# Patient Record
Sex: MALE | Race: WHITE
[De-identification: names, ages, dates, MRNs, and addresses within clinical notes are randomized per-mention and may not be internally consistent; named-entity substitution may affect disease eponyms.]

---

## 2017-02-19 ENCOUNTER — HOSPITAL ENCOUNTER (EMERGENCY)
Dept: HOSPITAL 61 - ER | Age: 62
Discharge: HOME | End: 2017-02-19
Payer: COMMERCIAL

## 2017-02-19 VITALS — HEIGHT: 72 IN | BODY MASS INDEX: 27.09 KG/M2 | WEIGHT: 200 LBS

## 2017-02-19 VITALS
SYSTOLIC BLOOD PRESSURE: 168 MMHG | SYSTOLIC BLOOD PRESSURE: 168 MMHG | DIASTOLIC BLOOD PRESSURE: 95 MMHG | DIASTOLIC BLOOD PRESSURE: 95 MMHG

## 2017-02-19 DIAGNOSIS — D72.829: ICD-10-CM

## 2017-02-19 DIAGNOSIS — I10: ICD-10-CM

## 2017-02-19 DIAGNOSIS — Z88.8: ICD-10-CM

## 2017-02-19 DIAGNOSIS — R00.0: ICD-10-CM

## 2017-02-19 DIAGNOSIS — E86.0: ICD-10-CM

## 2017-02-19 DIAGNOSIS — J11.1: Primary | ICD-10-CM

## 2017-02-19 LAB
ALBUMIN SERPL-MCNC: 3.7 G/DL (ref 3.4–5)
ALP SERPL-CCNC: 77 U/L (ref 46–116)
ALT SERPL-CCNC: 26 U/L (ref 16–63)
ANION GAP SERPL CALC-SCNC: 12 MMOL/L (ref 6–14)
AST SERPL-CCNC: 18 U/L (ref 15–37)
BACTERIA #/AREA URNS HPF: (no result) /HPF
BASOPHILS # BLD AUTO: 0 X10^3/UL (ref 0–0.2)
BASOPHILS NFR BLD: 0 % (ref 0–3)
BILIRUB DIRECT SERPL-MCNC: 0.1 MG/DL (ref 0–0.2)
BILIRUB SERPL-MCNC: 1.1 MG/DL (ref 0.2–1)
BILIRUB UR QL STRIP: NEGATIVE
BUN SERPL-MCNC: 16 MG/DL (ref 8–26)
CALCIUM SERPL-MCNC: 9.1 MG/DL (ref 8.5–10.1)
CHLORIDE SERPL-SCNC: 104 MMOL/L (ref 98–107)
CO2 SERPL-SCNC: 26 MMOL/L (ref 21–32)
CREAT SERPL-MCNC: 1 MG/DL (ref 0.7–1.3)
EOSINOPHIL NFR BLD: 0 % (ref 0–3)
ERYTHROCYTE [DISTWIDTH] IN BLOOD BY AUTOMATED COUNT: 13.7 % (ref 11.5–14.5)
GFR SERPLBLD BASED ON 1.73 SQ M-ARVRAT: 75.5 ML/MIN
GLUCOSE SERPL-MCNC: 104 MG/DL (ref 70–99)
GLUCOSE UR STRIP-MCNC: NEGATIVE MG/DL
HCT VFR BLD CALC: 46.4 % (ref 39–53)
HGB BLD-MCNC: 15.5 G/DL (ref 13–17.5)
LYMPHOCYTES # BLD: 0.6 X10^3/UL (ref 1–4.8)
LYMPHOCYTES NFR BLD AUTO: 5 % (ref 24–48)
MCH RBC QN AUTO: 33 PG (ref 25–35)
MCHC RBC AUTO-ENTMCNC: 34 G/DL (ref 31–37)
MCV RBC AUTO: 98 FL (ref 79–100)
MONOCYTES NFR BLD: 10 % (ref 0–9)
NEUTROPHILS NFR BLD AUTO: 84 % (ref 31–73)
NITRITE UR QL STRIP: NEGATIVE
OBC FLU: (no result)
PH UR STRIP: 5.5 [PH]
PLATELET # BLD AUTO: 297 X10^3/UL (ref 140–400)
POTASSIUM SERPL-SCNC: 3.8 MMOL/L (ref 3.5–5.1)
PROT SERPL-MCNC: 7.5 G/DL (ref 6.4–8.2)
PROT UR STRIP-MCNC: 100 MG/DL
RBC # BLD AUTO: 4.75 X10^6/UL (ref 4.3–5.7)
RBC #/AREA URNS HPF: (no result) /HPF (ref 0–2)
SODIUM SERPL-SCNC: 142 MMOL/L (ref 136–145)
SP GR UR STRIP: >=1.03
SQUAMOUS #/AREA URNS LPF: (no result) /LPF
UROBILINOGEN UR-MCNC: 0.2 MG/DL
WBC # BLD AUTO: 12.3 X10^3/UL (ref 4–11)
WBC #/AREA URNS HPF: (no result) /HPF (ref 0–4)

## 2017-02-19 PROCEDURE — 84484 ASSAY OF TROPONIN QUANT: CPT

## 2017-02-19 PROCEDURE — 80048 BASIC METABOLIC PNL TOTAL CA: CPT

## 2017-02-19 PROCEDURE — 85027 COMPLETE CBC AUTOMATED: CPT

## 2017-02-19 PROCEDURE — 71010: CPT

## 2017-02-19 PROCEDURE — 36415 COLL VENOUS BLD VENIPUNCTURE: CPT

## 2017-02-19 PROCEDURE — 93005 ELECTROCARDIOGRAM TRACING: CPT

## 2017-02-19 PROCEDURE — 80076 HEPATIC FUNCTION PANEL: CPT

## 2017-02-19 PROCEDURE — 83690 ASSAY OF LIPASE: CPT

## 2017-02-19 PROCEDURE — 81001 URINALYSIS AUTO W/SCOPE: CPT

## 2017-02-19 PROCEDURE — 70450 CT HEAD/BRAIN W/O DYE: CPT

## 2017-02-19 PROCEDURE — 87804 INFLUENZA ASSAY W/OPTIC: CPT

## 2017-02-19 NOTE — PHYS DOC
Past Medical History


Past Medical History:  Hypertension


Past Surgical History:  Other


Additional Past Surgical Histo:  HERNIA REPAIR


Alcohol Use:  None


Drug Use:  None





Adult General


Chief Complaint


Chief Complaint:  Influenza





HPI


HPI


61-year-old male presenting to the emergency department with a productive cough 

for the past 3 weeks. He reports thick cleared sputum. He reports fevers 

myalgias and chills at home. He has been using multiple different medications 

including cough syrups antibiotics and steroids and has been seen by his 

primary care. Today he has had worsening shortness of breath with headache and 

body aches. His pain is moderate intermittent generalized and without 

alleviating factors.





Review of systems is negative for chest pain abdominal pain nausea or vomiting. 

All other review of systems is negative unless otherwise noted in history of 

present illness.





Review of Systems


Review of Systems


SEE ABOVE.





Allergies


Allergies





 Allergies








Coded Allergies Type Severity Reaction Last Updated Verified


 


  meperidine Allergy Intermediate  2/19/17 Yes











Physical Exam


Physical Exam





Constitutional: Well developed, well nourished, no acute distress, non-toxic 

appearance. 


HENT: Normocephalic, atraumatic, bilateral external ears normal, oropharynx 

moist, no oral exudates, nose normal. []


Eyes: PERRLA, EOMI, conjunctiva normal, no discharge.  


Neck: Normal range of motion, no tenderness, supple, no stridor. [] 


Cardiovascular:Heart rate regular rhythm, no murmur 


Lungs & Thorax:  Bilateral breath sounds clear to auscultation []


Abdomen: Bowel sounds normal, soft, no tenderness, no masses, no pulsatile 

masses.  


Skin: Warm, dry, no erythema, no rash. [] 


Back: No tenderness, no CVA tenderness. [] 


Extremities: No tenderness, no cyanosis, no clubbing, ROM intact, no edema.  


Neurologic: Alert and oriented X 3, normal motor function, normal sensory 

function, no focal deficits noted. 


Psychologic: Affect normal, judgement normal, mood normal. []





Current Patient Data


Vital Signs





 Vital Signs








  Date Time  Temp Pulse Resp B/P Pulse Ox O2 Delivery O2 Flow Rate FiO2


 


2/19/17 23:04  100  168/95    


 


2/19/17 20:49 99.3  25  96 Room Air  





 99.3       








Lab Values





 Laboratory Tests








Test


  2/19/17


21:15 2/19/17


22:22 2/19/17


22:38


 


White Blood Count


  12.3x10^3/uL


(4.0-11.0)  H 


  


 


 


Red Blood Count


  4.75x10^6/uL


(4.30-5.70) 


  


 


 


Hemoglobin


  15.5g/dL


(13.0-17.5) 


  


 


 


Hematocrit


  46.4%


(39.0-53.0) 


  


 


 


Mean Corpuscular Volume 98fL ()    


 


Mean Corpuscular Hemoglobin 33pg (25-35)    


 


Mean Corpuscular Hemoglobin


Concent 34g/dL (31-37)


  


  


 


 


Red Cell Distribution Width


  13.7%


(11.5-14.5) 


  


 


 


Platelet Count


  297x10^3/uL


(140-400) 


  


 


 


Neutrophils (%) (Auto) 84% (31-73)  H  


 


Lymphocytes (%) (Auto) 5% (24-48)  L  


 


Monocytes (%) (Auto) 10% (0-9)  H  


 


Eosinophils (%) (Auto) 0% (0-3)    


 


Basophils (%) (Auto) 0% (0-3)    


 


Neutrophils # (Auto)


  10.3x10^3uL


(1.8-7.7)  H 


  


 


 


Lymphocytes # (Auto)


  0.6x10^3/uL


(1.0-4.8)  L 


  


 


 


Monocytes # (Auto)


  1.3x10^3/uL


(0.0-1.1)  H 


  


 


 


Eosinophils # (Auto)


  0.0x10^3/uL


(0.0-0.7) 


  


 


 


Basophils # (Auto)


  0.0x10^3/uL


(0.0-0.2) 


  


 


 


Sodium Level


  142mmol/L


(136-145) 


  


 


 


Potassium Level


  3.8mmol/L


(3.5-5.1) 


  


 


 


Chloride Level


  104mmol/L


() 


  


 


 


Carbon Dioxide Level


  26mmol/L


(21-32) 


  


 


 


Anion Gap 12 (6-14)    


 


Blood Urea Nitrogen


  16mg/dL (8-26)


  


  


 


 


Creatinine


  1.0mg/dL


(0.7-1.3) 


  


 


 


Estimated GFR


(Cockcroft-Gault) 75.5  


  


  


 


 


Glucose Level


  104mg/dL


(70-99)  H 


  


 


 


Calcium Level


  9.1mg/dL


(8.5-10.1) 


  


 


 


Total Bilirubin


  1.1mg/dL


(0.2-1.0)  H 


  


 


 


Direct Bilirubin


  0.1mg/dL


(0.0-0.2) 


  


 


 


Aspartate Amino Transferase


(AST) 18U/L (15-37)  


  


  


 


 


Alanine Aminotransferase (ALT) 26U/L (16-63)    


 


Alkaline Phosphatase


  77U/L ()


  


  


 


 


Troponin I Quantitative


  < 0.017ng/mL


(0.000-0.055) 


  


 


 


Total Protein


  7.5g/dL


(6.4-8.2) 


  


 


 


Albumin


  3.7g/dL


(3.4-5.0) 


  


 


 


Lipase


  173U/L


() 


  


 


 


Influenza Type A Antigen


  


  Positive


(NEGATIVE) 


 


 


Influenza Type B Antigen


  


  Negative


(NEGATIVE) 


 


 


Urine Collection Type   Unknown  


 


Urine Color   Yellow  


 


Urine Clarity   Cloudy  


 


Urine pH   5.5  


 


Urine Specific Gravity   >=1.030  


 


Urine Protein


  


  


  100mg/dL


(NEG-TRACE)


 


Urine Glucose (UA)


  


  


  Negativemg/dL


(NEG)


 


Urine Ketones (Stick)


  


  


  Negativemg/dL


(NEG)


 


Urine Blood   Small (NEG)  


 


Urine Nitrite


  


  


  Negative (NEG)


 


 


Urine Bilirubin


  


  


  Negative (NEG)


 


 


Urine Urobilinogen Dipstick


  


  


  0.2mg/dL (0.2


mg/dL)


 


Urine Leukocyte Esterase


  


  


  Negative (NEG)


 


 


Urine RBC   3-5/HPF (0-2)  


 


Urine WBC   1-4/HPF (0-4)  


 


Urine Squamous Epithelial


Cells 


  


  Many/LPF  


 


 


Urine Amorphous Sediment   Present/HPF  


 


Urine Bacteria


  


  


  Few/HPF


(0-FEW)


 


Urine Granular Casts


  


  


  Occasional/HPF


 


 


Urine Mucus   Marked/LPF  





 Laboratory Tests


2/19/17 21:15








 Laboratory Tests


2/19/17 21:15














EKG


EKG


EKG shows sinus rhythm with regular rate. Normal intervals. left dupree axis. ST 

segments are congruent. Not suggestive of ACS. Reviewed by myself.[]





Radiology/Procedures


Radiology/Procedures


[]Chest x-ray reviewed by myself shows no obvious infiltrate or pneumothorax 

present. No  obvious acute cardiopulmonary process present.





Course & Med Decision Making


Course & Med Decision Making


Pertinent Labs and Imaging studies reviewed. (See chart for details)





[] 61-year-old male presenting to the emergency department with signs and 

symptoms suggestive of influenza. On examination the patient had a low-grade 

temperature with mild tachycardia. Otherwise hypertension. Physical exam was 

unremarkable. Chest x-ray unremarkable. EKG unremarkable. Blood work obtained 

which showed mild leukocytosis urinalysis showed dehydration without suggestion 

of infection. Chemistry panel unremarkable. Flu testing positive. The patient 

was prescribed Tamiflu ibuprofen and azithromycin to follow-up with his primary 

care physician over the next 2-3 days.





Dragon Disclaimer


Dragon Disclaimer


This electronic medical record was generated, in whole or in part, using a 

voice recognition dictation system.





Departure


Departure


Impression:  


 Primary Impression:  


 Influenza


Disposition:  09 ADMITTED AS INPATIENT


Condition:  STABLE


Referrals:  


LILLIG,SHAWN P MD (PCP)


Patient Instructions:  Influenza, Adult





Additional Instructions:


Thank you for allowing us to participate in your care today.





Followup with your primary care physician in 3 days if your symptoms do not 

improve. If you do not have a primary care provider you can ask for a list of 

our primary care providers. Return to the emergency department you have any new 

or concerning findings.





This should be evaluated by the primary care physician and any necessary 

consulting services for continued management within a few days after discharge. 

Return to emergency room if you have any  new or concerning symptoms including 

but not limited to fever, chills, nausea, vomiting, intractable pain, any new 

rashes, chest pain, shortness of air, uncontrolled bleeding, difficulty 

breathing, and/or vision loss.


Scripts


Azithromycin (Azithromycin Tablet)250 Mg Tablet1 Pkg PO UD #6 TAB


   Prov:TASH VIVAR MD         2/19/17


Oseltamivir Phosphate (Tamiflu)75 Mg Capsule1 Cap PO BID #10 CAP


   Prov:TASH VIVAR MD         2/19/17


Ibuprofen 200 Mg [id number] Mg PO PRN Q6HRS PRN INFLAMMATION #15 TAB


   Prov:TASH VIVAR MD         2/19/17








TASH VIVAR MD Feb 19, 2017 23:16

## 2017-02-20 NOTE — RAD
Portable chest, 2/19/2017:



History: Cough



Comparison is made to a study from 10/2/2011. The heart size and pulmonary

vascularity are normal. There is mild tortuosity of the thoracic aorta. No

pulmonary infiltrates are seen. There is no evidence of pleural fluid. Mild

spurring is present in the spine.



IMPRESSION: No acute cardiopulmonary abnormality is detected.

## 2017-02-20 NOTE — EKG
[facility]

               [address] [place] [address]

Test Date:    2017               Test Time:    21:25:53

Pat Name:     HAKAN RODRIGUEZ           Department:   

Patient ID:   [id number]           Room:          

Gender:       M                        Technician:   

:          1955               Requested By: TASH VIVAR

Order Number: [id number]            Reading MD:     

                                 Measurements

Intervals                              Axis          

Rate:         96                       P:            34

MS:           132                      QRS:          4

QRSD:         90                       T:            19

QT:           332                                    

QTc:          426                                    

                           Interpretive Statements

SINUS RHYTHM

NO SPECIFIC ECG ABNORMALITIES

RI6.01

No previous ECG available for comparison

## 2019-02-05 ENCOUNTER — HOSPITAL ENCOUNTER (OUTPATIENT)
Dept: HOSPITAL 61 - SURGPAT | Age: 64
Discharge: HOME | End: 2019-02-05
Attending: SURGERY
Payer: COMMERCIAL

## 2019-02-05 DIAGNOSIS — K40.90: ICD-10-CM

## 2019-02-05 DIAGNOSIS — Z01.818: Primary | ICD-10-CM

## 2019-02-05 PROCEDURE — 93005 ELECTROCARDIOGRAM TRACING: CPT

## 2019-02-05 NOTE — EKG
[facility]

              [address] [place] [address]

Test Date:    2019               Test Time:    14:30:07

Pat Name:     HAKAN RODRIGUEZ           Department:   

Patient ID:   [id number]           Room:          

Gender:       M                        Technician:   SUNSHINE

:          1955               Requested By: LAURITA CASILLAS

Order Number: [id number]           Reading MD:   Sawyer Moreno MD

                                 Measurements

Intervals                              Axis          

Rate:         78                       P:            45

VT:           146                      QRS:          1

QRSD:         88                       T:            33

QT:           364                                    

QTc:          418                                    

                           Interpretive Statements

SINUS RHYTHM



Electronically Signed On 2019 16:42:31 CST by Sawyer Moreno MD

## 2019-02-08 ENCOUNTER — HOSPITAL ENCOUNTER (OUTPATIENT)
Dept: HOSPITAL 61 - SURG | Age: 64
Discharge: HOME | End: 2019-02-08
Attending: SURGERY
Payer: COMMERCIAL

## 2019-02-08 VITALS
SYSTOLIC BLOOD PRESSURE: 143 MMHG | DIASTOLIC BLOOD PRESSURE: 86 MMHG | SYSTOLIC BLOOD PRESSURE: 143 MMHG | DIASTOLIC BLOOD PRESSURE: 86 MMHG

## 2019-02-08 VITALS — WEIGHT: 186 LBS | BODY MASS INDEX: 25.19 KG/M2 | HEIGHT: 72 IN

## 2019-02-08 DIAGNOSIS — Z82.49: ICD-10-CM

## 2019-02-08 DIAGNOSIS — F17.200: ICD-10-CM

## 2019-02-08 DIAGNOSIS — K40.90: Primary | ICD-10-CM

## 2019-02-08 DIAGNOSIS — Z88.6: ICD-10-CM

## 2019-02-08 DIAGNOSIS — Z79.899: ICD-10-CM

## 2019-02-08 DIAGNOSIS — Z88.8: ICD-10-CM

## 2019-02-08 DIAGNOSIS — I10: ICD-10-CM

## 2019-02-08 PROCEDURE — C1781 MESH (IMPLANTABLE): HCPCS

## 2019-02-08 PROCEDURE — 49650 LAP ING HERNIA REPAIR INIT: CPT

## 2019-02-08 PROCEDURE — A7015 AEROSOL MASK USED W NEBULIZE: HCPCS

## 2019-02-08 PROCEDURE — C1782 MORCELLATOR: HCPCS

## 2019-02-08 RX ADMIN — FENTANYL CITRATE PRN MCG: 50 INJECTION INTRAMUSCULAR; INTRAVENOUS at 09:59

## 2019-02-08 RX ADMIN — FENTANYL CITRATE PRN MCG: 50 INJECTION INTRAMUSCULAR; INTRAVENOUS at 10:16

## 2019-02-08 NOTE — DISCH
DISCHARGE INSTRUCTIONS


Condition on Discharge


Condition on Discharge:  Stable





Activity After Discharge


Activity Instructions for Disc:  Avoid exertion


Other activity instructions:  no lifting more than 20 pounds for 2 weeks





Diet after Discharge


Diet after Discharge:  Regular





Wound Incision Care


Other wound/incision instructi:  May shower in 24 hours





Contacting the DRChey after DC


Call your doctor for:  If your condition worsens





Follow-Up


Follow up with:  Dr. Casillas in 2 weeks











LAURITA CASILLAS MD Feb 8, 2019 10:19

## 2019-02-08 NOTE — PDOC4
Operative Note


Operative Note


Date: 2/8/2019   


Preoperative diagnosis: Right inguinal hernia


Postoperative diagnosis: Same


Procedure: Robotic-assisted laparoscopic right inguinal hernia repair with mesh


Surgeon: Ismael


Specimen: None


Dictation: Patient is a 63-year-old male is complained of right groin pain with 

a bulge procedure of robotic-assisted laparoscopic right inguinal hernia repair 

with mesh was explained to the patient in detail risks benefits were also 

discussed including bleeding infection injury to intra-abdominal contents 

possibly necessitating further or open operations. The patient seemed to 

understand and gave both verbal and written consent to have the procedure 

performed. Patient was taken to the operative Raposa supine position general 

anesthesia was initiated once patient was sleep and intubated is placed in low 

lithotomy position and his abdomen was prepped and draped usual sterile fashion 

using ChloraPrep and area just above the umbilicus was injected with quarter 

percent Marcaine with epinephrine incision was made Lembert scalpel Veress 

needle was placed within the abdomen creating pneumoperitoneum once this 

complete a da Erna 8 mm port was placed and a camera was placed within the 

abdomen to inspected patient was placed in steep Trendelenburg was noted there 

is fairly large right inguinal hernia with some incarcerated small bowel left 

side appeared normal. A 8 mm da Erna ports placed in the right mid abdomen and 

a second 8 mm da Erna ports placed in the left mid abdomen that eventually 

robotic then brought in and docked all port sites surgeon went to the robotic 

console using cautery grasper and Endo Jose Francisco scissors the peritoneum over the 

right groin area was incised this was propagated posteriorly reducing all 

hernia contents in the hernia sac was reduced once this pocket was complete a 

large Bard 3-D max mesh for the right side was placed over the hernia defect 

the peritoneum was then closed with a running 20 the LOC absorbable suture. 

Surgeon re-scrubbed back to the operative field and all ports were removed and 

intervention robot undocked all incisions at the skin sites were closed for 

septic tumor Monocryl Mastisol Steri-Strips and island dressings were applied. 

The patient was awakened and extubated in the operating room taken to recovery 

in stable condition all sponge instrument needle counts listed as correct 

estimated blood loss 5 mL.











LAURITA CASILLAS MD Feb 8, 2019 09:11

## 2019-10-30 ENCOUNTER — HOSPITAL ENCOUNTER (OUTPATIENT)
Dept: HOSPITAL 61 - US | Age: 64
Discharge: HOME | End: 2019-10-30
Attending: SURGERY
Payer: COMMERCIAL

## 2019-10-30 DIAGNOSIS — K40.91: Primary | ICD-10-CM

## 2019-10-30 PROCEDURE — 76881 US COMPL JOINT R-T W/IMG: CPT

## 2019-10-30 NOTE — RAD
EXAM: Soft tissue ultrasound right groin.

 

HISTORY: Right inguinal hernia repair in February. Now with groin pain 

after a pop.

 

COMPARISON: None.

 

FINDINGS: Sonographic evaluation of the right groin was performed at the 

site of concern. This reveals a recurrent fat-containing hernia. The 

hernia sac measures 4.7 x 2.8 cm. No bowel loops are involved.

 

IMPRESSION: 

1. Fat-containing recurrent right inguinal hernia.

 

Electronically signed by: MEGAN Solano MD (10/30/2019 4:36 PM) 

[facility]

## 2020-01-24 ENCOUNTER — HOSPITAL ENCOUNTER (OUTPATIENT)
Dept: HOSPITAL 61 - SURG | Age: 65
Discharge: HOME | End: 2020-01-24
Attending: SURGERY
Payer: MEDICARE

## 2020-01-24 VITALS
DIASTOLIC BLOOD PRESSURE: 89 MMHG | DIASTOLIC BLOOD PRESSURE: 89 MMHG | SYSTOLIC BLOOD PRESSURE: 138 MMHG | SYSTOLIC BLOOD PRESSURE: 138 MMHG

## 2020-01-24 VITALS — WEIGHT: 185.19 LBS | HEIGHT: 72 IN | BODY MASS INDEX: 25.08 KG/M2

## 2020-01-24 DIAGNOSIS — Z88.8: ICD-10-CM

## 2020-01-24 DIAGNOSIS — Z79.899: ICD-10-CM

## 2020-01-24 DIAGNOSIS — K40.91: Primary | ICD-10-CM

## 2020-01-24 DIAGNOSIS — Z98.890: ICD-10-CM

## 2020-01-24 DIAGNOSIS — I10: ICD-10-CM

## 2020-01-24 PROCEDURE — C1781 MESH (IMPLANTABLE): HCPCS

## 2020-01-24 PROCEDURE — 49651 LAP ING HERNIA REPAIR RECUR: CPT

## 2020-01-24 PROCEDURE — A7015 AEROSOL MASK USED W NEBULIZE: HCPCS

## 2020-01-24 NOTE — PDOC1
History and Physical


Date of Admission


Date of Admission


DATE: 1/24/20 


TIME: 07:45





Identification/Chief Complaint


Chief Complaint


Right groin pain





Source


Source:  Patient





History of Present Illness


History of Present Illness


64-year-old male had a right inguinal hernia repair done February 2019 several 

months after he developed recurrent right groin pain ultrasound shows recurrent 

right inguinal hernia





Past Medical History


Cardiovascular:  HTN


Pulmonary:  No pertinent hx


GI:  No pertinent hx


Heme/Onc:  No pertinent hx


Hepatobiliary:  No pertinent hx


Psych:  No pertinent hx


Rheumatologic:  No pertinent hx


Infectious disease:  No pertinent hx


ENT:  No pertinent hx


Endocrine:  No pertinent hx


Dermatology:  No pertinent hx





Past Surgical History


Past Surgical History:  Hernia Repair (bilateral inguinal hernia repairs)





Family History


Family History:  No Significant





Social History


Smoke:  No


ALCOHOL:  none


Drugs:  None





Current Medications


Current Medications





Current Medications


Ondansetron HCl (Zofran) 4 mg PRN Q6HRS  PRN IV NAUSEA/VOMITING;  Start 1/24/20 

at 07:00;  Stop 1/25/20 at 06:59


Fentanyl Citrate (Fentanyl 2ml Vial) 25 mcg PRN Q5MIN  PRN IV MILD PAIN 1-3;  

Start 1/24/20 at 07:00;  Stop 1/25/20 at 06:59


Fentanyl Citrate (Fentanyl 2ml Vial) 50 mcg PRN Q5MIN  PRN IV MODERATE TO SEVERE

PAIN;  Start 1/24/20 at 07:00;  Stop 1/25/20 at 06:59


Morphine Sulfate (Morphine Sulfate) 1 mg PRN Q10MIN  PRN IV SEVERE PAIN 7-10;  

Start 1/24/20 at 07:00;  Stop 1/25/20 at 06:59


Ringer's Solution 1,000 ml @  30 mls/hr Q24H IV  Last administered on 1/24/20at 

07:25;  Start 1/24/20 at 07:00;  Stop 1/24/20 at 18:59


Lidocaine HCl (Xylocaine-Mpf 1% 2ml Vial) 2 ml PRN 1X  PRN ID PRIOR TO IV START;

 Start 1/24/20 at 07:00;  Stop 1/25/20 at 06:59


Hydromorphone HCl (Dilaudid) 0.5 mg PRN Q10MIN  PRN IV SEV PAIN, Second choice; 

Start 1/24/20 at 07:00;  Stop 1/25/20 at 06:59


Prochlorperazine Edisylate (Compazine) 5 mg PACU PRN  PRN IV NAUSEA, MRX1;  

Start 1/24/20 at 07:00;  Stop 1/25/20 at 06:59


Acetaminophen (Tylenol) 1,000 mg OC PROC  PRN PO PRE-OP Last administered on 

1/24/20at 07:26;  Start 1/24/20 at 06:00;  Stop 1/24/20 at 18:00


Cefazolin Sodium/ Dextrose 50 ml @  100 mls/hr 1X PREOP  PRN IV PRIOR TO 

PROCEDURE;  Start 1/24/20 at 06:00;  Stop 1/24/20 at 18:00


Bupivacaine HCl/ Epinephrine Bitart (Sensorcaine-Epi 0.25%-1:200543 Mpf) 30 ml 

STK-MED ONCE .ROUTE ;  Start 1/24/20 at 07:23;  Stop 1/24/20 at 07:23;  Status 

DC





Active Scripts


Active


Reported


Omeprazole 40 Mg Capsule.dr 1 Cap PO DAILY


Lisinopril 20 Mg Tablet 1 Tab PO BID





Allergies


Allergies:  


Coded Allergies:  


     meperidine (Verified  Adverse Reaction, Intermediate, Pt imobilized.  Makes

skin hurt., 1/24/20)


     morphine (Verified  Adverse Reaction, Mild, Doesn't work, 1/24/20)





ROS


Genitourinary:  YES Pain (right groin pain)





Physical Exam


General:  Alert, Oriented X3, Cooperative, mild distress


HEENT:  Atraumatic, PERRLA, EOMI


Lungs:  Clear to auscultation, Normal air movement


Heart:  RRR, no murmurs


Abdomen:  Normal bowel sounds, Soft, No tenderness


Male Genitals Exam:  inguinal tenderness (right groin tenderness with asymmetry)


Rectal Exam:  not examined


Extremities:  No edema


Skin:  No significant lesion


Neuro:  Normal speech


Psych/Mental Status:  Mental status NL





Vitals


Vitals





Vital Signs








  Date Time  Temp Pulse Resp B/P (MAP) Pulse Ox O2 Delivery O2 Flow Rate FiO2


 


1/24/20 07:21 97.9 77 20  100   





 97.9       


 


1/24/20 07:13    163/87  Room Air  











VTE Prophylaxis Ordered


VTE Prophylaxis Devices:  Yes


VTE Pharmacological Prophylaxi:  Contraindicated





Assessment/Plan


Assessment/Plan


Recurrent right inguinal hernia plan robotic-assisted laparoscopic inguinal 

hernia repair with mesh











LAURITA CASILLAS MD             Jan 24, 2020 07:48

## 2020-01-24 NOTE — PDOC4
Operative Note


Operative Note


Date: 1/24/2020


Preoperative diagnosis: Recurrent right inguinal hernia


Postoperative diagnosis: Same


Procedure: Robotic-assisted laparoscopic right inguinal hernia repair with mesh


Surgeon: Ismael


Specimen: None


Dictation: Patient is a 64-year-old woman who had bilateral inguinal hernia 

repairs many years ago developed a recurrence in the right inguinal hernia 

underwent laparoscopic repair approximately 1 year ago has developed a 

recurrence procedure of robotic-assisted laparoscopic right inguinal hernia 

repair was explained to the patient detail risk benefits were also discussed 

including bleeding infection injury to intra-abdominal contents possibly 

necessitating further or open operations alternatives to this procedure also 

discussed with the patient who seemed to understand and gave both verbal and 

written consent to have the procedure performed. Patient was taken to the 

operating room placed in supine position general anesthesia was initiated once 

patient was sleep and intubated was placed in low lithotomy position and his 

abdomen was prepped and draped usual sterile fashion using ChloraPrep and area 

at the umbilicus was injected with quarter percent Marcaine with epinephrine 

incision was made 11 blade scalpel and a varies needle was placed within the 

abdomen creating pneumoperitoneum once this complete 8mm da Erna port was 

placed and a da Erna camera was placed within the abdomen which was inspected 

was noted in the right groin have a fairly large direct inguinal hernia. A area 

in the right mid abdomen was injected with quarter percent Marcaine incision was

made with an 11 blade  scalpel and a 8 mm da Erna port was placed in the right 

side as well as on the left. Eventually robot was brought in and docked all port

sites surgeon went to the robotic console using grasper and Endo Jose Francisco scissors

the peritoneum over the right side was taken down reducing the hernia defect. 

Fairly large direct hernia used a Bard ventral light ST mesh 4 x 6" this was 

placed over the hernia defect this was treated into place with 3-0 Vicryl 

superiorly along the border of the mesh to the abdominal wall and then a suture 

was placed at the Ruperto's ligament. The perineum was then closed over the mesh 

with a running V LOC absorbable suture. Once this was complete the 

pneumoperitoneum was reduced all ports removed the skin incisions were closed f

or septic and a Monocryl Mastisol Steri-Strips and island dressings were 

applied. Patient was awakened and asked bated operating room taken to recovery 

in stable condition all sponge instrument needle counts listed as correct 

estimated blood loss 5 mL











LAURITA CASILLAS MD             Jan 24, 2020 09:42

## 2020-01-24 NOTE — DISCH
DISCHARGE INSTRUCTIONS


Condition on Discharge


Condition on Discharge:  Stable





Activity After Discharge


Activity Instructions for Disc:  Avoid exertion


Other activity instructions:  no lifting more than 20 pounds for 2 weeks





Diet after Discharge


Diet after Discharge:  Regular





Wound Incision Care


Other wound/incision instructi:  a shower in 24 hours





Contacting the DRChey after DC


Call your doctor for:  If your condition worsens





Follow-Up


Follow up with:  Dr. Casillas in 2 weeks











LAURITA CASILLAS MD             Jan 24, 2020 09:44

## 2020-02-11 ENCOUNTER — HOSPITAL ENCOUNTER (OUTPATIENT)
Dept: HOSPITAL 61 - KCIC MRI | Age: 65
Discharge: HOME | End: 2020-02-11
Attending: FAMILY MEDICINE
Payer: COMMERCIAL

## 2020-02-11 DIAGNOSIS — M25.78: ICD-10-CM

## 2020-02-11 DIAGNOSIS — M43.12: ICD-10-CM

## 2020-02-11 DIAGNOSIS — M47.22: ICD-10-CM

## 2020-02-11 DIAGNOSIS — M53.2X2: ICD-10-CM

## 2020-02-11 DIAGNOSIS — M50.23: ICD-10-CM

## 2020-02-11 DIAGNOSIS — M89.38: ICD-10-CM

## 2020-02-11 DIAGNOSIS — M50.31: Primary | ICD-10-CM

## 2020-02-11 PROCEDURE — 72141 MRI NECK SPINE W/O DYE: CPT

## 2020-02-11 NOTE — [facility]
MRI Cervical Spine Without Contrast

 

History: Radiculopathy, previous fall, progressive neck pain, loss of 

range of motion, stiffness, crepitus, bilateral upper extremity numbness

 

Technique: Multiplanar, multi sequential noncontrast MR imaging was 

performed of the cervical spine.

 

Comparison: None

 

Findings: There is significant motion degradation. Cervical cord caliber 

is within normal limits, no defined or expansile cervical cord signal 

abnormality. Accurate evaluation for subtle cord signal change is limited 

due to motion degradation. There is mild reversal of the lordotic 

curvature centered near C5-C6. There is grade 1 posterior subluxation C6 

relative to C7, very mild grade 1 anterior spondylolisthesis C3-4 and 

C4-C5. There is fairly advanced degenerative disc disease greater 

posteriorly at C6-7, lesser degree of degenerative disc disease at C5-C6, 

and minimally at C3-C4. There is edema associated with the facet 

articulations greater on the right at C2-3.

 

C2-C3:  There is severe facet degenerative change on the left, to lesser 

degree on the right. Spinal canal and right neural foramen are adequate, 

likely mild narrowing of the left neural foramen from posteriorly by 

facet.

 

C3-C4:  There is severe left facet hypertrophic change, to lesser degree 

on the right. There is minimal disc osteophyte complex, superimposed 

shallow protrusion greatest centrally about 1 to 2 mm AP, mild indentation

upon the ventral thecal sac. Central canal is adequate about 11 mm. There 

is left uncovertebral degenerative change. Difficult to accurately 

characterize due to motion, there is probable moderate to severe narrowing

of the left neural foramen, possible mild narrowing on the right. 

 

C4-C5:  Spinal canal is adequate. There is severe, left greater than right

facet degenerative change. There may be minimal narrowing of the right 

neural foramen, left neural foramen not significantly narrowed.

 

C5-C6:  There is severe facet degenerative change bilaterally. There is 

disc osteophyte complex and bulge. There is at least mild buckling of the 

ligamentum flavum. Central canal is narrowed to about 7 to 8 mm also with 

mild, right greater than left lateral recess stenosis. There is likely 

uncovertebral degenerative change. Neural foramina are poorly 

characterized due to motion, probable fairly severe neural foramina 

compromise bilaterally.

 

C6-C7:   There is disc osteophyte complex and bulge superimposed on the 

posteriorly subluxed C6 vertebral body margin. There is buckling of the 

ligamentum flavum. Spinal canal is narrowed to about 7 to 8 mm. There is 

severe bilateral facet degenerative change, also bilateral uncovertebral 

degenerative change. Neural foramina are poorly characterized due to 

severe motion, probable severe left and moderate to severe right neural 

foramina compromise.

 

C7-T1:  Spinal canal and left neural foramen are adequate. Facet and 

uncovertebral degenerative change contributes to likely mild narrowing of 

the right neural foramen.

 

Impression: 

1.  There is fairly advanced degenerative disc disease at C6-7 and to 

lesser degree at C5-6. There is mild spondylosis.

2. There is central canal stenosis about 7 mm at C5-6 and C6-7.

3. There is multilevel facet and uncovertebral degenerative change. There 

is multilevel cervical neural foramina compromise although limited 

accurate characterization of the neural foramina due to motion 

degradation. There is likely more significant neural foramina compromise 

bilaterally at C6-7 and C5-C6 and on the left at C3-C4.

4. There is multilevel mild abnormal alignment as stated. There is 

multilevel cervical facet degenerative change. There is some edema 

associated with the facet articulations greater on the right at C2-3 more 

likely to be reactive in etiology.

 

Electronically signed by: Marques Bell MD (2/11/2020 1:54 PM) 

[facility]

## 2022-01-04 ENCOUNTER — HOSPITAL ENCOUNTER (INPATIENT)
Dept: HOSPITAL 61 - ER | Age: 67
Discharge: HOME | DRG: 177 | End: 2022-01-04
Attending: FAMILY MEDICINE | Admitting: FAMILY MEDICINE
Payer: MEDICARE

## 2022-01-04 VITALS — BODY MASS INDEX: 25.25 KG/M2 | WEIGHT: 180.34 LBS | HEIGHT: 71 IN

## 2022-01-04 VITALS — SYSTOLIC BLOOD PRESSURE: 166 MMHG | DIASTOLIC BLOOD PRESSURE: 85 MMHG

## 2022-01-04 DIAGNOSIS — Z82.49: ICD-10-CM

## 2022-01-04 DIAGNOSIS — F15.10: ICD-10-CM

## 2022-01-04 DIAGNOSIS — R07.89: ICD-10-CM

## 2022-01-04 DIAGNOSIS — K21.9: ICD-10-CM

## 2022-01-04 DIAGNOSIS — Z28.3: ICD-10-CM

## 2022-01-04 DIAGNOSIS — Z88.8: ICD-10-CM

## 2022-01-04 DIAGNOSIS — I10: ICD-10-CM

## 2022-01-04 DIAGNOSIS — U07.1: Primary | ICD-10-CM

## 2022-01-04 DIAGNOSIS — J12.82: ICD-10-CM

## 2022-01-04 DIAGNOSIS — F17.210: ICD-10-CM

## 2022-01-04 LAB
% BANDS: 3 % (ref 0–9)
% LYMPHS: 4 % (ref 24–48)
% MONOS: 9 % (ref 0–10)
% SEGS: 82 % (ref 35–66)
ALBUMIN SERPL-MCNC: 3.6 G/DL (ref 3.4–5)
ALBUMIN/GLOB SERPL: 1.1 {RATIO} (ref 1–1.7)
ALP SERPL-CCNC: 86 U/L (ref 46–116)
ALT SERPL-CCNC: 25 U/L (ref 16–63)
AMPHETAMINE/METHAMPHETAMINE: (no result)
ANION GAP SERPL CALC-SCNC: 6 MMOL/L (ref 6–14)
APTT PPP: YELLOW S
AST SERPL-CCNC: 15 U/L (ref 15–37)
BACTERIA #/AREA URNS HPF: 0 /HPF
BARBITURATES UR-MCNC: (no result) UG/ML
BASOPHILS # BLD AUTO: 0 X10^3/UL (ref 0–0.2)
BASOPHILS NFR BLD: 0 % (ref 0–3)
BENZODIAZ UR-MCNC: (no result) UG/L
BILIRUB SERPL-MCNC: 0.6 MG/DL (ref 0.2–1)
BILIRUB UR QL STRIP: NEGATIVE
BUN SERPL-MCNC: 19 MG/DL (ref 8–26)
BUN/CREAT SERPL: 16 (ref 6–20)
CALCIUM SERPL-MCNC: 8.8 MG/DL (ref 8.5–10.1)
CANNABINOIDS UR-MCNC: (no result) UG/L
CHLORIDE SERPL-SCNC: 100 MMOL/L (ref 98–107)
CO2 SERPL-SCNC: 29 MMOL/L (ref 21–32)
COCAINE UR-MCNC: (no result) NG/ML
CREAT SERPL-MCNC: 1.2 MG/DL (ref 0.7–1.3)
EOSINOPHIL NFR BLD AUTO: 2 % (ref 0–5)
EOSINOPHIL NFR BLD: 0.1 X10^3/UL (ref 0–0.7)
EOSINOPHIL NFR BLD: 1 % (ref 0–3)
ERYTHROCYTE [DISTWIDTH] IN BLOOD BY AUTOMATED COUNT: 13.7 % (ref 11.5–14.5)
FIBRINOGEN PPP-MCNC: CLEAR MG/DL
GFR SERPLBLD BASED ON 1.73 SQ M-ARVRAT: 60.2 ML/MIN
GLUCOSE SERPL-MCNC: 104 MG/DL (ref 70–99)
HCT VFR BLD CALC: 44.2 % (ref 39–53)
HGB BLD-MCNC: 14.7 G/DL (ref 13–17.5)
INFLUENZA A PATIENT: NEGATIVE
INFLUENZA B PATIENT: NEGATIVE
LIPASE: 193 U/L (ref 73–393)
LYMPHOCYTES # BLD: 0.3 X10^3/UL (ref 1–4.8)
LYMPHOCYTES NFR BLD AUTO: 5 % (ref 24–48)
MAGNESIUM SERPL-MCNC: 2.1 MG/DL (ref 1.8–2.4)
MCH RBC QN AUTO: 33 PG (ref 25–35)
MCHC RBC AUTO-ENTMCNC: 33 G/DL (ref 31–37)
MCV RBC AUTO: 99 FL (ref 79–100)
METHADONE SERPL-MCNC: (no result) NG/ML
MONO #: 1 X10^3/UL (ref 0–1.1)
MONOCYTES NFR BLD: 13 % (ref 0–9)
NEUT #: 5.8 X10^3/UL (ref 1.8–7.7)
NEUTROPHILS NFR BLD AUTO: 81 % (ref 31–73)
NITRITE UR QL STRIP: NEGATIVE
OPIATES UR-MCNC: (no result) NG/ML
PCP SERPL-MCNC: (no result) MG/DL
PH UR STRIP: 6 [PH]
PLATELET # BLD AUTO: 257 X10^3/UL (ref 140–400)
PLATELET # BLD EST: ADEQUATE 10*3/UL
POTASSIUM SERPL-SCNC: 4.3 MMOL/L (ref 3.5–5.1)
PROT SERPL-MCNC: 6.9 G/DL (ref 6.4–8.2)
PROT UR STRIP-MCNC: NEGATIVE MG/DL
RBC # BLD AUTO: 4.48 X10^6/UL (ref 4.3–5.7)
RBC #/AREA URNS HPF: (no result) /HPF (ref 0–2)
SODIUM SERPL-SCNC: 135 MMOL/L (ref 136–145)
TOXIC GRANULES BLD QL SMEAR: SLIGHT
UROBILINOGEN UR-MCNC: 0.2 MG/DL
WBC # BLD AUTO: 7.2 X10^3/UL (ref 4–11)
WBC #/AREA URNS HPF: (no result) /HPF (ref 0–4)

## 2022-01-04 PROCEDURE — 83690 ASSAY OF LIPASE: CPT

## 2022-01-04 PROCEDURE — 84484 ASSAY OF TROPONIN QUANT: CPT

## 2022-01-04 PROCEDURE — 85007 BL SMEAR W/DIFF WBC COUNT: CPT

## 2022-01-04 PROCEDURE — 81001 URINALYSIS AUTO W/SCOPE: CPT

## 2022-01-04 PROCEDURE — 70450 CT HEAD/BRAIN W/O DYE: CPT

## 2022-01-04 PROCEDURE — 83880 ASSAY OF NATRIURETIC PEPTIDE: CPT

## 2022-01-04 PROCEDURE — 83735 ASSAY OF MAGNESIUM: CPT

## 2022-01-04 PROCEDURE — 87426 SARSCOV CORONAVIRUS AG IA: CPT

## 2022-01-04 PROCEDURE — 80307 DRUG TEST PRSMV CHEM ANLYZR: CPT

## 2022-01-04 PROCEDURE — 93005 ELECTROCARDIOGRAM TRACING: CPT

## 2022-01-04 PROCEDURE — 85025 COMPLETE CBC W/AUTO DIFF WBC: CPT

## 2022-01-04 PROCEDURE — 36415 COLL VENOUS BLD VENIPUNCTURE: CPT

## 2022-01-04 PROCEDURE — 80053 COMPREHEN METABOLIC PANEL: CPT

## 2022-01-04 PROCEDURE — 87804 INFLUENZA ASSAY W/OPTIC: CPT

## 2022-01-04 PROCEDURE — 5A09357 ASSISTANCE WITH RESPIRATORY VENTILATION, LESS THAN 24 CONSECUTIVE HOURS, CONTINUOUS POSITIVE AIRWAY PRESSURE: ICD-10-PCS | Performed by: FAMILY MEDICINE

## 2022-01-04 PROCEDURE — 71045 X-RAY EXAM CHEST 1 VIEW: CPT

## 2022-01-04 RX ADMIN — GABAPENTIN SCH MG: 300 CAPSULE ORAL at 14:16

## 2022-01-04 RX ADMIN — GABAPENTIN SCH MG: 300 CAPSULE ORAL at 08:23

## 2022-01-04 NOTE — DS
DATE OF DISCHARGE: 01/04/2022

HOSPITAL SUMMARY:  A 66-year-old white male who came in with nonspecific lower 

chest pressure and pain up to 3 days with cough, congestion and some malaise.  

Troponins x2 was negative.  Echocardiogram result is pending at this time.  CBC 

and chemistry profile were unremarkable.  COVID serology was positive, but PCR 

pending.  Chest x-ray showed some left lower lobe opacities consistent with mild

pneumonia, likely from COVID as well.  Urine drug screen was positive for 

methamphetamine and amphetamine.  Rest was negative.  He was monitored in the ER

and no acute cardiac injury was noted and cardiology saw him and was willing to 

follow up as an outpatient with further testing.  He is going to be discharged 

at this point as he has no need for oxygen with good saturations and his COVID 

status is relatively mild.  He understands that if his chest pain recurs or 

worsens then he will go back to the ER for further evaluation and definitive 

admission and possibly a cardiac catheterization.



FINAL DIAGNOSES:

1.  Chest pain, etiology undetermined.

2.  Positive COVID serology with left lower lobe infiltrates, likely secondary 

to COVID pneumonia.

3.  Positive urine drug screen for methamphetamine.

4.  Hypertension.



OPERATIONS, PROCEDURES, AND COMPLICATIONS:  None.



CONSULTATION:   _____ group.



DISPOSITION:  Continue home meds including lisinopril, which apparently has not 

been taking regularly.  Meth use discussed and recommended avoiding further 

abuse of stimulants as it could exacerbate underlying coronary artery disease.  

Should his COVID status worsens and he will come back to the hospital for 

further evaluation.  Prognosis guarded at this point.  We will see in the office

as a telemedicine 1 week.







ANGELIA/YENNY

DR: Ene   DD: 01/04/2022 16:14

DT: 01/04/2022 19:14   TID: [id number]

## 2022-01-04 NOTE — PDOC2
OTIS PURCELL APRN 1/4/22 1129:


CARDIAC CONSULT


DATE OF CONSULT


Date of Consult


DATE: 1/4/22 


TIME: 11:22





REASON FOR CONSULT


Reason for Consult:


Chest pain





REFERRING PHYSICIAN


Referring Physician:


Manfred





SOURCE


Source:  Chart review, Patient





HISTORY OF PRESENT ILLNESS


HISTORY OF PRESENT ILLNESS


This is a 65 yo male admitted for complains of flu like symptoms namely, fever, 

chills and malaise. He had some SOA and has been having dry cough and also with 

some chest discomfort. Also had some nausea and vomiting and loose stools. He is

unvaccinated for covid-19 and flu. Presently he denies those symptoms mentioned 

and reported lower sternal pressure that is nonradiating. He is not in distress 

but on RA he is 86% O2 sat. He answers to questions but currently sleepy.





PAST MEDICAL HISTORY


Cardiovascular:  HTN


CENTRAL NERVOUS SYSTEM:  Other (No pertinent history)


GI:  GERD





PAST SURGICAL HISTORY


Past Surgical History:  Hernia Repair (left inguinal), Other (lower back 

surgery)





FAMILY HISTORY


Family History:  Hypertension





SOCIAL HISTORY


Smoke:  1 pack per day


ALCOHOL:  occassional


Drugs:  Crystal meth


Lives:  with Family





CURRENT MEDICATIONS


CURRENT MEDICATIONS





Current Medications








 Medications


  (Trade)  Dose


 Ordered  Sig/Pacheco


 Route


 PRN Reason  Start Time


 Stop Time Status Last Admin


Dose Admin


 


 Nitroglycerin


  (Nitrostat)  0.4 mg  PRN Q5MIN  PRN


 SL


 CHEST PAIN  1/4/22 02:45


    1/4/22 03:10





 


 Acetaminophen


  (Tylenol)  1,000 mg  1X  ONCE


 PO


   1/4/22 03:00


 1/4/22 03:01 DC 1/4/22 03:09





 


 Ibuprofen


  (Motrin)  800 mg  1X  ONCE


 PO


   1/4/22 08:00


 1/4/22 08:01 DC 1/4/22 08:04





 


 Gabapentin


  (Neurontin)  300 mg  TID


 PO


   1/4/22 09:00


    1/4/22 08:23





 


 Pantoprazole


 Sodium


  (Protonix)  40 mg  DAILYAC


 PO


   1/4/22 08:30


    1/4/22 08:23





 


 Aspirin


  (Angeline Aspirin)  325 mg  DAILYWBKFT


 PO


   1/4/22 09:00


    1/4/22 08:26














ALLERGIES


ALLERGIES:  


Coded Allergies:  


     meperidine (Verified  Adverse Reaction, Intermediate, Pt imobilized.  Makes

skin hurt., 1/24/20)


     morphine (Verified  Adverse Reaction, Mild, Doesn't work, 1/24/20)





ROS


Review of System


14 point ROS evaluated with pertinent positives noted per HPI





PHYSICAL EXAM


General:  Oriented X3, Cooperative, No acute distress, Other (drowsy)


HEENT:  Atraumatic, Mucous membr. moist/pink


Lungs:  Other (diminished)


Heart:  Regular rate (SR), Normal S1, Normal S2, No murmurs


Abdomen:  Soft, No tenderness


Extremities:  No cyanosis, No edema


Skin:  No breakdown, No significant lesion


Neuro:  Normal speech, Sensation intact


Psych/Mental Status:  Other (drowsy, flat affect)


MUSCULOSKELETAL:  Osteoarthritic changes both hands





VITALS/I&O


VITALS/I&O:





                                   Vital Signs








  Date Time  Temp Pulse Resp B/P (MAP) Pulse Ox O2 Delivery O2 Flow Rate FiO2


 


1/4/22 10:06  88 18 157/80 (105) 100 Room Air  


 


1/4/22 02:10 100.0       





 100.0       











LABS


Lab:





                                Laboratory Tests








Test


 1/4/22


02:25 1/4/22


02:54 1/4/22


05:32 1/4/22


06:37


 


White Blood Count


 7.2 x10^3/uL


(4.0-11.0) 


 


 





 


Red Blood Count


 4.48 x10^6/uL


(4.30-5.70) 


 


 





 


Hemoglobin


 14.7 g/dL


(13.0-17.5) 


 


 





 


Hematocrit


 44.2 %


(39.0-53.0) 


 


 





 


Mean Corpuscular Volume


 99 fL ()


 


 


 





 


Mean Corpuscular Hemoglobin 33 pg (25-35)     


 


Mean Corpuscular Hemoglobin


Concent 33 g/dL


(31-37) 


 


 





 


Red Cell Distribution Width


 13.7 %


(11.5-14.5) 


 


 





 


Platelet Count


 257 x10^3/uL


(140-400) 


 


 





 


Neutrophils (%) (Auto) 81 % (31-73)  H   


 


Lymphocytes (%) (Auto) 5 % (24-48)  L   


 


Monocytes (%) (Auto) 13 % (0-9)  H   


 


Eosinophils (%) (Auto) 1 % (0-3)     


 


Basophils (%) (Auto) 0 % (0-3)     


 


Neutrophils # (Auto)


 5.8 x10^3/uL


(1.8-7.7) 


 


 





 


Lymphocytes # (Auto)


 0.3 x10^3/uL


(1.0-4.8)  L 


 


 





 


Monocytes # (Auto)


 1.0 x10^3/uL


(0.0-1.1) 


 


 





 


Eosinophils # (Auto)


 0.1 x10^3/uL


(0.0-0.7) 


 


 





 


Basophils # (Auto)


 0.0 x10^3/uL


(0.0-0.2) 


 


 





 


Segmented Neutrophils % 82 % (35-66)  H   


 


Band Neutrophils % 3 % (0-9)     


 


Lymphocytes % 4 % (24-48)  L   


 


Monocytes % 9 % (0-10)     


 


Eosinophils % 2 % (0-5)     


 


Toxic Granulation Slight     


 


Platelet Estimate


 Adequate


(ADEQUATE) 


 


 





 


Sodium Level


 135 mmol/L


(136-145)  L 


 


 





 


Potassium Level


 4.3 mmol/L


(3.5-5.1) 


 


 





 


Chloride Level


 100 mmol/L


() 


 


 





 


Carbon Dioxide Level


 29 mmol/L


(21-32) 


 


 





 


Anion Gap 6 (6-14)     


 


Blood Urea Nitrogen


 19 mg/dL


(8-26) 


 


 





 


Creatinine


 1.2 mg/dL


(0.7-1.3) 


 


 





 


Estimated GFR


(Cockcroft-Gault) 60.2  


 


 


 





 


BUN/Creatinine Ratio 16 (6-20)     


 


Glucose Level


 104 mg/dL


(70-99)  H 


 


 





 


Calcium Level


 8.8 mg/dL


(8.5-10.1) 


 


 





 


Magnesium Level


 2.1 mg/dL


(1.8-2.4) 


 


 





 


Total Bilirubin


 0.6 mg/dL


(0.2-1.0) 


 


 





 


Aspartate Amino Transferase


(AST) 15 U/L (15-37)


 


 


 





 


Alanine Aminotransferase (ALT)


 25 U/L (16-63)


 


 


 





 


Alkaline Phosphatase


 86 U/L


() 


 


 





 


Troponin I High Sensitivity


 13 ng/L (4-75)


 


 13 ng/L (4-75)


 





 


NT-Pro-B-Type Natriuretic


Peptide 427 pg/mL


(0-124)  H 


 


 





 


Total Protein


 6.9 g/dL


(6.4-8.2) 


 


 





 


Albumin


 3.6 g/dL


(3.4-5.0) 


 


 





 


Albumin/Globulin Ratio 1.1 (1.0-1.7)     


 


Lipase


 193 U/L


() 


 


 





 


Ethyl Alcohol Level


 < 10 mg/dL


(0-10) 


 


 





 


Influenza Type A Antigen


 


 Negative


(NEGATIVE) 


 





 


Influenza Type B Antigen


 


 Negative


(NEGATIVE) 


 





 


SARS-CoV-2 Antigen (Rapid)


 


 Positive


(NEGATIVE)  *A 


 





 


Urine Collection Type    Unknown  


 


Urine Color    Yellow  


 


Urine Clarity    Clear  


 


Urine pH


 


 


 


 6.0 (<5.0-8.0)





 


Urine Specific Gravity


 


 


 


 1.020


(1.000-1.030)


 


Urine Protein


 


 


 


 Negative mg/dL


(NEG-TRACE)


 


Urine Glucose (UA)


 


 


 


 Negative mg/dL


(NEG)


 


Urine Ketones (Stick)


 


 


 


 Negative mg/dL


(NEG)


 


Urine Blood    Small (NEG)  


 


Urine Nitrite


 


 


 


 Negative (NEG)





 


Urine Bilirubin


 


 


 


 Negative (NEG)





 


Urine Urobilinogen Dipstick


 


 


 


 0.2 mg/dL (0.2


mg/dL)


 


Urine Leukocyte Esterase


 


 


 


 Negative (NEG)





 


Urine RBC


 


 


 


 3-5 /HPF (0-2)





 


Urine WBC


 


 


 


 Occ /HPF (0-4)





 


Urine Squamous Epithelial


Cells 


 


 


 Few /LPF  





 


Urine Bacteria


 


 


 


 0 /HPF (0-FEW)





 


Urine Mucus    Slight /LPF  


 


Urine Opiates Screen    Neg (NEG)  


 


Urine Methadone Screen    Neg (NEG)  


 


Urine Barbiturates    Neg (NEG)  


 


Urine Phencyclidine Screen    Neg (NEG)  


 


Urine


Amphetamine/Methamphetamine 


 


 


 Pos (NEG)  





 


Urine Benzodiazepines Screen    Neg (NEG)  


 


Urine Cocaine Screen    Neg (NEG)  


 


Urine Cannabinoids Screen    Neg (NEG)  


 


Urine Ethyl Alcohol    Neg (NEG)  


 


Test


 1/4/22


08:30 


 


 





 


Troponin I High Sensitivity


 14 ng/L (4-75)


 


 


 








                                Laboratory Tests


1/4/22 02:25








                                Laboratory Tests


1/4/22 02:25











ASSESSMENT/PLAN


ASSESSMENT/PLAN


1. Chest pain: doubt ACS, likely MSK


2. Covid-19 pneumonia: unvaccinated, per PCP


3. Tobaccoism


4. HTN: labile episodes


5. Substance abuse: UDS+meth, denies use





Recommendations


Lifestyle modification


Continue covid-19 treatment per PCP


Resume home lisinopril


Supportive care





DEMAR OLSEN MD 1/5/22 0614:


CARDIAC CONSULT


ASSESSMENT/PLAN


ASSESSMENT/PLAN


Patient seen and examined.  Agree with NP's assessment and plan.


CP with atypical features and  most prob musculoskeletal


MI ruled out


Continue management of covid PNA per IM


Plan echo and ischemic evaluation once covid recovered


Thank you for your consultation











OTIS PURCELL           Jan 4, 2022 11:29


DEMAR OLSEN MD            Jan 5, 2022 06:14

## 2022-01-04 NOTE — RAD
EXAM: CT Head without IV contrast



CLINICAL HISTORY: Reason: headache / Spl. Instructions:  / History:  



COMPARISON: None.



TECHNIQUE:

Routine CT of the head without contrast.



PQRS compliance statement - One or more of the following individualized dose reduction techniques wer
e utilized for this study:

1.  Automated exposure control

2.  Adjustment of the mA and/or kV according to patient size

3.  Use of iterative reconstruction technique



FINDINGS:  

There is no evidence of hemorrhage, mass or extra-axial fluid collection.



Grey-white differentiation is maintained with no evidence of edema. Subcortical, periventricular as w
ell as deep white matter foci of hypoattenuation likely small vessel disease.



There is no mass effect or shift of the intracranial structures.



The ventricles, basilar cisterns and cortical sulci are normal in size and configuration for the howard
ents stated age.



The cerebellum and brainstem are unremarkable.  



The calvarium demonstrates no evidence of fracture or focal lesion.



There is normal aeration of the visualized paranasal sinuses and mastoid air cells.



The visualized portions of the orbits are normal.



IMPRESSION:

1.  No evidence for acute intracranial process.

2.  White matter changes likely small vessel disease.



Electronically signed by: Abhishek Stewart MD (1/4/2022 3:51 AM) TERA

## 2022-01-04 NOTE — EKG
[facility]

              [address] [place] [address]

Test Date:    2022               Test Time:    02:09:38

Pat Name:     HAKAN RODRIGUEZ           Department:   

Patient ID:   [id number]           Room:          

Gender:       M                        Technician:   

:          [id number]               Requested By: LOUISE SANDRA

Order Number: [id number]           Reading MD:   Hayden Gallardo

                                 Measurements

Intervals                              Axis          

Rate:         107                      P:            107

WV:           146                      QRS:          19

QRSD:         98                       T:            21

QT:           334                                    

QTc:          451                                    

                           Interpretive Statements

SINUS TACHYCARDIA

ATRIAL PREMATURE COMPLEX(ES)

Electronically Signed On 2022 15:09:32 CST by Hayden Gallardo

## 2022-01-04 NOTE — RAD
EXAM: AP View of the chest



DATE: 1/4/2022 3:24 AM



INDICATION: Reason: chest pain / Spl. Instructions:  / History: 



COMPARISON: 2/19/2017



FINDINGS:

The heart is not enlarged.



Mediastinal and hilar contours are normal.



Left perihilar and lung base airspace opacities likely consolidative processes or pneumonia.



No pleural effusion or pneumothorax.



IMPRESSION:

Left perihilar and lung base airspace opacities likely consolidative processes or pneumonia.



Electronically signed by: Abhishek Stewart MD (1/4/2022 3:47 AM) TERA

## 2022-01-04 NOTE — HP
DATE OF SERVICE: 01/04/2022

ADMIT DATE: 01/04/2022

CHIEF COMPLAINT:  Chest pressure.



HISTORY OF PRESENT ILLNESS:  A 66-year-old white male with a history of 

hypertension, takes lisinopril periodically, but not on a regular basis.  He has

never had any cardiac events.  He came in with a few days of fatigue, some cough

and pressure-like lower midsternal discomfort.  This is not necessarily activity

related.  Denies dysphagia, fever, chills, sputum production or other 

complaints.  COVID in the ER was positive as well as his urine drug screen for 

meth, but he denies use.  Troponins have been negative x 2 at this point.



PAST MEDICAL HISTORY:  He denies all vaccines in the past, had a COVID vaccine. 

He has had no serious medical problems in the past.  There are no specific 

cardiac evaluation that he is aware of.



ALLERGIES:  Denies drug allergies.



SOCIAL HISTORY:  He smoked since he was 13.  Alcohol use is intermittent.  He is

.  He is not currently employed.



FAMILY HISTORY:  Unremarkable.



REVIEW OF SYSTEMS:  No other complaints at this time.



PHYSICAL EXAMINATION:

ENT:  All within normal limits with a mask in place.

NECK:  No carotid bruits, nodes or masses.

LUNGS:  Decreased breath sounds.  No tachypnea or wheezing is heard.  No 

tachycardia is present.

CARDIOVASCULAR:  Regular rate.  No irregular beat, tachycardia or murmur.

ABDOMEN:  Soft, benign, nontender.

EXTREMITIES:  Unremarkable.



ASSESSMENT:  Positive for COVID-19 and methamphetamine use.  Chest pain is 

suspicious for cardiac origin.  Risk factors certainly include history of 

hypertension, chronic tobacco use as well.



PLAN:  Echocardiogram.  Further evaluation based on results.







PETTY

DR: Ene   DD: 01/04/2022 08:46

DT: 01/04/2022 08:55   TID: [id number]

## 2022-01-04 NOTE — PHYS DOC
Past Medical History


Past Medical History:  Hypertension


Past Surgical History:  Other


Additional Past Surgical Histo:  HERNIA REPAIR


Smoking Status:  Never Smoker


Alcohol Use:  None


Drug Use:  None





General Adult


HPI:


HPI:





Patient is a 66  year old male who presents with chest pain that began tonight. 

He reports that he began feeling headache, malaise, fevers and chills a little 

over 24 hours ago.  He does report some mild dyspnea.  He denies pleuritic pain.

 He reports mild, dry cough.  Denies hemoptysis.  He describes the chest pain is

midsternal and pressure-like.  The pain is intermittent, without specific 

exacerbating or relieving factors.  He reports the chest pain is not as severe 

now as it was previously.  He does report nausea without vomiting.  He reports a

few episodes of loose stools.  He denies recent surgery, travel, 

hospitalization.  He denies lower extremity pain or swelling.  He does report 

some dizziness associated the chest pain, denies diaphoresis.  He is not 

vaccinated gets COVID-19 or influenza.  His wife is not vaccinated either.  She 

denies any respiratory symptoms or chest pain symptoms.  The patient and his 

wife indicate that he has a "leaky blood vessel" in his brain.  He reportedly 

saw neurology for this back in .  He reports that he had some weakness and 

had a CT scan, which showed the abnormality.  I repeatedly asked him if he had 

an aneurysm, and he reports that he was not told he had an aneurysm.  He does 

report that he was told by his neurologist to never take aspirin.  The patient 

also reports that he has no medical problems, takes no medications.  Previous 

records do indicate that he has been prescribed lisinopril for hypertension.  I 

am unable to locate any previous records from  to clarify any of the 

information the patient and his wife are trying to explain.





Review of Systems:


Review of Systems:


Constitutional:   Fever, chills, malaise.


Eyes:   Denies change in visual acuity. []


HENT:   Denies nasal congestion or sore throat. [] 


Respiratory:   Dry cough. Mild dyspnea.  Denies hemoptysis.


Cardiovascular:   Chest pain.  Denies peripheral edema.


GI:   Denies abdominal pain. Reports nausea but no vomiting.  Loose stool 

reported.


:  Denies dysuria. [] 


Musculoskeletal:   Denies back pain or joint pain. Diffuse myalgias reported.


Integument:   Denies rash. [] 


Neurologic:   Headache.  Denies numbness, tingling, motor weakness, syncope. 


Endocrine:   Denies polyuria or polydipsia. [] 


Lymphatic:  Denies swollen glands. [] 


Psychiatric:  Denies depression or anxiety. []





Heart Score:


C/O Chest Pain:  Yes


HEART Score for Chest Pain:  








HEART Score for Chest Pain Response (Comments) Value


 


History Moderately Suspicious 1


 


ECG Nonspecific Repolarizatio 1


 


Age > 65 2


 


Risk Factors                            1 or 2 Risk Factors 1


 


Troponin >1-<3x Normal Limit 1


 


Total  6








Risk Factors:


Risk Factors:  DM, Current or recent (<one month) smoker, HTN, HLP, family 

history of CAD, obesity.


Risk Scores:


Score 0 - 3:  2.5% MACE over next 6 weeks - Discharge Home


Score 4 - 6:  20.3% MACE over next 6 weeks - Admit for Clinical Observation


Score 7 - 10:  72.7% MACE over next 6 weeks - Early Invasive Strategies





Allergies:


Allergies:





Allergies








Coded Allergies Type Severity Reaction Last Updated Verified


 


  meperidine Adverse Reaction Intermediate Pt imobilized.  Makes skin hurt. 

20 Yes


 


  morphine Adverse Reaction Mild Doesn't work 20 Yes











Physical Exam:


PE:





Constitutional: Well developed, well nourished, no acute distress, non-toxic 

appearance. []


HENT: Normocephalic, atraumatic, oropharynx is patent and clear without exudate 

or erythema, mucous membranes are moist.  TMs are clear bilaterally.


Eyes: PERRL, EOMI, conjunctiva normal, no discharge.  No nystagmus.  No scleral 

icterus


Neck: Normal range of motion, no tenderness, supple, no stridor.  No 

meningismus.  Trachea is midline.  No JVD


Cardiovascular:Heart rate regular rhythm, +2 radial and +2 posterior tibial 

pulses bilaterally


Lungs & Thorax:  Bilateral breath sounds clear to auscultation, mildly 

diminished in bilateral bases, upper lung fields are clear, no rales, rhonchi or

 wheezes.  Equal chest rise, no distress.


Abdomen: Bowel sounds normal, soft, no tenderness, no masses, no pulsatile 

masses. [] 


Skin: Warm, dry, no erythema, no rash. [] 


Back: No tenderness, no CVA tenderness. [] 


Extremities: No tenderness, no cyanosis, no clubbing, ROM intact, no edema.  No 

calf tenderness.


Neurologic: He is awake, alert, oriented x3, cranial nerves II through XII 

grossly intact.  5 out of 5 motor strength all 4 extremities.  Sensation is 

grossly intact.  Speech is clear and fluent.  No pronator drift or dysmetria.  

No limb ataxia.  He is ambulatory with a steady gait.


Psychologic: Affect is flat.  He is cooperative.  []





EKG:


EKG:


EKG is interpreted at 0211


Rhythm is sinus tachycardia, frequent PACs


Rate 107 bpm


No STEMI





EKG is interpreted at 0549


Rhythm is sinus, with frequent PACs


Rate is 83 bpm


No STEMI





Radiology/Procedures:


Radiology/Procedures:


                                 IMAGING REPORT





                                     Signed





PATIENT: HAKAN RODRIGUEZ ACCOUNT: [id number]     MRN#: [id number]


: 1955           LOCATION: ED HOLD         AGE: 66


SEX: M                    EXAM DT: 22         ACCESSION#: [id number]


STATUS: ADM IN            ORD. PHYSICIAN: LOUISE SANDRA DO


REASON: headache


PROCEDURE: CT HEAD WO CONTRAST





EXAM: CT Head without IV contrast





CLINICAL HISTORY: Reason: headache / Spl. Instructions:  / History:  





COMPARISON: None.





TECHNIQUE:


Routine CT of the head without contrast.





PQRS compliance statement - One or more of the following individualized dose 

reduction techniques were utilized for this study:


1.  Automated exposure control


2.  Adjustment of the mA and/or kV according to patient size


3.  Use of iterative reconstruction technique





FINDINGS:  


There is no evidence of hemorrhage, mass or extra-axial fluid collection.





Grey-white differentiation is maintained with no evidence of edema. Subcortical,

 periventricular as well as deep white matter foci of hypoattenuation likely 

small vessel disease.





There is no mass effect or shift of the intracranial structures.





The ventricles, basilar cisterns and cortical sulci are normal in size and 

configuration for the patients stated age.





The cerebellum and brainstem are unremarkable.  





The calvarium demonstrates no evidence of fracture or focal lesion.





There is normal aeration of the visualized paranasal sinuses and mastoid air 

cells.





The visualized portions of the orbits are normal.





IMPRESSION:


1.  No evidence for acute intracranial process.


2.  White matter changes likely small vessel disease.





Electronically signed by: Abhishek Crabtree MD (2022 3:51 AM) [facility]














DICTATED and SIGNED BY:     ABHISHEK CRABTREE MD


DATE:     22 [id number] 0








                                 IMAGING REPORT





                                     Signed





PATIENT: HAKAN RODRIGUEZ ACCOUNT: [id number]     MRN#: [id number]


: 1955           LOCATION: ER              AGE: 66


SEX: M                    EXAM DT: 22         ACCESSION#: [id number]


STATUS: REG ER            ORD. PHYSICIAN: LOUISE SANDRA DO


REASON: chest pain


PROCEDURE: PORTABLE CHEST 1V





EXAM: AP View of the chest





DATE: 2022 3:24 AM





INDICATION: Reason: chest pain / Spl. Instructions:  / History: 





COMPARISON: 2017





FINDINGS:


The heart is not enlarged.





Mediastinal and hilar contours are normal.





Left perihilar and lung base airspace opacities likely consolidative processes 

or pneumonia.





No pleural effusion or pneumothorax.





IMPRESSION:


Left perihilar and lung base airspace opacities likely consolidative processes 

or pneumonia.





Electronically signed by: Abhishek Crabtree MD (2022 3:47 AM) [facility]














DICTATED and SIGNED BY:     ABHISHEK CRABTREE MD


DATE:     22 [id number] 0





Course & Med Decision Making:


Course & Med Decision Making


Pertinent Labs and Imaging studies reviewed. (See chart for details)





The patient refused aspirin, he reports that he was told never to take aspirin. 

 I amenable to review any records from  in this EMR, and I see no indication

 of any aspirin contraindication on his previous H&P from Dr. Guevara for his 

hernia repairs.  The patient is given nitroglycerin, reportedly without any 

relief of pain.  He is offered further pain medication, which she declines.  He 

is given Tylenol and ibuprofen for his fever here.  He is resting comfortably.  

He manifests no evidence of distress.  He is not hypoxic.  I did explain that I 

am concerned about his chest pain symptoms, he has never previously seen 

cardiology services.  I recommend admission to the hospital.  He is comfortable 

with this plan.  The patient is positive for COVID-19.  The patient's wife left,

 and shortly after leaving, called back and demanded to speak with me or the 

nurse about his condition.  I kindly picked up the phone and discussed this with

 her, with the patient's permission.  I explained that he has COVID-19.  The 

patient's wife became increasingly hostile, argumentative and started yelling at

 me over the phone.  She reports that he does not believe that he has COVID-19. 

 She behave similarly to the nurse on the phone after I spoke with her.  I did 

reiterate to her that I'm still recommending admission to the hospital, 

cardiology consultation and trending serial troponins.  I did review with her 

that he appears to have a diagnosis of hypertension and had previously been 

prescribed lisinopril.  The patient's wife became angry and yelled that the 

patient doesn't like to take medications, and he likes to control his blood 

pressure "naturally" with supplements and controlling his stress.  She angrily 

and loudly declares that just because someone has been told they have high blood

 pressure doesn't mean they really have high blood pressure.  I kindly excused 

myself from the phone call explaining that I do not feel that the conversation 

is beneficial, as she is becoming argumentative and hostile.  I did explain that

 we would keep her up-to-date with any new information, if warranted.  I did 

explain this to the patient as well.  The patient expresses no concerns with his

 care, he is comfortable with the plan of care, and he has been quite 

cooperative himself.  The patient admits that he has been told he has high blood

 pressure but simply chooses not to take the medication prescribed to him.  I 

spoke with his primary care physician, Dr. Rebecca Rodriguez, who accepts him for 

admission





Dragon Disclaimer:


Dragon Disclaimer:


This electronic medical record was generated, in whole or in part, using a voice

 recognition dictation system.





Departure


Departure


Impression:  


   Primary Impression:  


   Chest pain


   Additional Impression:  


   COVID-19


Disposition:   ADMITTED AS INPATIENT


Admitting Physician:  Rebecca Rodriguez


Condition:  GUARDED


Referrals:  


REBECCA RODRIGUEZ MD (PCP)











LOUISE SANDRA DO              2022 02:17

## 2022-01-04 NOTE — EKG
[facility]

              [address] [place] [address]

Test Date:    2022               Test Time:    05:46:35

Pat Name:     HAKAN RODRIGUEZ           Department:   

Patient ID:   [id number]           Room:         ED HOLD 16

Gender:       M                        Technician:   

:          1955               Requested By: LOUISE SANDRA

Order Number: [id number]           Reading MD:   Hayden Gallardo

                                 Measurements

Intervals                              Axis          

Rate:         83                       P:            80

OR:           146                      QRS:          25

QRSD:         96                       T:            39

QT:           370                                    

QTc:          440                                    

                           Interpretive Statements

SINUS RHYTHM

ATRIAL PREMATURE COMPLEX(ES)

Electronically Signed On 2022 15:08:42 CST by Hayden Gallardo